# Patient Record
Sex: MALE | HISPANIC OR LATINO | Employment: UNEMPLOYED | URBAN - METROPOLITAN AREA
[De-identification: names, ages, dates, MRNs, and addresses within clinical notes are randomized per-mention and may not be internally consistent; named-entity substitution may affect disease eponyms.]

---

## 2020-02-07 ENCOUNTER — TELEPHONE (OUTPATIENT)
Dept: CARDIAC SURGERY | Facility: CLINIC | Age: 30
End: 2020-02-07

## 2020-02-07 NOTE — TELEPHONE ENCOUNTER
Patient called back like I asked him to.  I wanted to confirm his surgery date with Devang so patient could make travel arrangements from [place]. He will be admitted on 4-7-2020 with his surgery on 4-8-2020. These dates were ok with Devang and he will begin to make arrangements for these dates. Per Devang he will be a self pay payment.  He is going to call back around April 1 to confirm everything.

## 2020-03-06 ENCOUNTER — TELEPHONE (OUTPATIENT)
Dept: CARDIAC SURGERY | Facility: CLINIC | Age: 30
End: 2020-03-06

## 2020-03-06 ENCOUNTER — PREP FOR SURGERY (OUTPATIENT)
Dept: OTHER | Facility: HOSPITAL | Age: 30
End: 2020-03-06

## 2020-03-06 DIAGNOSIS — I35.9 AORTIC VALVE DISEASE: ICD-10-CM

## 2020-03-06 DIAGNOSIS — I71.21 THORACIC ASCENDING AORTIC ANEURYSM (HCC): Primary | ICD-10-CM

## 2020-03-06 NOTE — TELEPHONE ENCOUNTER
[facility]. He is to be admitted on 4-7-2020 with surgery on 4-8-2020. Patient will be flying in from [place] for this admission. Ronnie Jane is also aware of this admission.  Patient is a Self Pay. Beyond this, this is all the information I have on him.

## 2020-10-14 ENCOUNTER — PREP FOR SURGERY (OUTPATIENT)
Dept: OTHER | Facility: HOSPITAL | Age: 30
End: 2020-10-14

## 2020-10-14 DIAGNOSIS — I71.21 THORACIC ASCENDING AORTIC ANEURYSM (HCC): Primary | ICD-10-CM

## 2021-12-13 ENCOUNTER — TELEPHONE (OUTPATIENT)
Dept: CARDIAC SURGERY | Facility: CLINIC | Age: 31
End: 2021-12-13

## 2021-12-13 ENCOUNTER — PREP FOR SURGERY (OUTPATIENT)
Dept: OTHER | Facility: HOSPITAL | Age: 31
End: 2021-12-13

## 2021-12-13 DIAGNOSIS — I35.0 AORTIC VALVE STENOSIS, ETIOLOGY OF CARDIAC VALVE DISEASE UNSPECIFIED: Primary | ICD-10-CM

## 2021-12-13 NOTE — TELEPHONE ENCOUNTER
Spoke to [facility] for Direct admit on 1- with Devang. Surgery scheduled for 1-. Patient is a self pay. This has been approved with Ronnie Jane and others for self pay. Arrangement have already been made.